# Patient Record
Sex: FEMALE | Race: WHITE | NOT HISPANIC OR LATINO | Employment: FULL TIME | URBAN - METROPOLITAN AREA
[De-identification: names, ages, dates, MRNs, and addresses within clinical notes are randomized per-mention and may not be internally consistent; named-entity substitution may affect disease eponyms.]

---

## 2021-06-02 ENCOUNTER — RECORDS - HEALTHEAST (OUTPATIENT)
Dept: ADMINISTRATIVE | Facility: CLINIC | Age: 56
End: 2021-06-02

## 2021-07-21 ENCOUNTER — RECORDS - HEALTHEAST (OUTPATIENT)
Dept: ADMINISTRATIVE | Facility: CLINIC | Age: 56
End: 2021-07-21

## 2022-08-21 ENCOUNTER — APPOINTMENT (OUTPATIENT)
Dept: RADIOLOGY | Facility: CLINIC | Age: 57
End: 2022-08-21
Attending: EMERGENCY MEDICINE
Payer: COMMERCIAL

## 2022-08-21 ENCOUNTER — HOSPITAL ENCOUNTER (EMERGENCY)
Facility: CLINIC | Age: 57
Discharge: HOME | End: 2022-08-21
Attending: EMERGENCY MEDICINE | Admitting: EMERGENCY MEDICINE
Payer: COMMERCIAL

## 2022-08-21 VITALS
RESPIRATION RATE: 14 BRPM | HEART RATE: 77 BPM | OXYGEN SATURATION: 97 % | DIASTOLIC BLOOD PRESSURE: 69 MMHG | SYSTOLIC BLOOD PRESSURE: 112 MMHG | HEIGHT: 67 IN | WEIGHT: 150 LBS | BODY MASS INDEX: 23.54 KG/M2

## 2022-08-21 DIAGNOSIS — S62.102A WRIST FRACTURE, LEFT, CLOSED, INITIAL ENCOUNTER: ICD-10-CM
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
## 2022-08-21 PROCEDURE — 99285 EMERGENCY DEPT VISIT HI MDM: CPT | Mod: 25

## 2022-08-21 PROCEDURE — 999N000065 XR WRIST LEFT 2 VIEWS: Mod: LT

## 2022-08-21 PROCEDURE — 25605 CLTX DST RDL FX/EPHYS SEP W/: CPT | Mod: LT

## 2022-08-21 PROCEDURE — 73110 X-RAY EXAM OF WRIST: CPT | Mod: LT

## 2022-08-21 RX ORDER — OXYCODONE HYDROCHLORIDE 5 MG/1
5 TABLET ORAL 3 TIMES DAILY PRN
Qty: 9 TABLET | Refills: 0 | Status: SHIPPED | OUTPATIENT
Start: 2022-08-21 | End: 2022-08-24

## 2022-08-21 ASSESSMENT — ENCOUNTER SYMPTOMS
NAUSEA: 0
JOINT SWELLING: 0
SORE THROAT: 0
CONFUSION: 0
DIARRHEA: 0
ABDOMINAL PAIN: 0
CHILLS: 0
HEMATURIA: 0
DYSURIA: 0
FEVER: 0
SHORTNESS OF BREATH: 0
VOMITING: 0
DIZZINESS: 0
WOUND: 1

## 2022-08-21 ASSESSMENT — ACTIVITIES OF DAILY LIVING (ADL)
ADLS_ACUITY_SCORE: 35
ADLS_ACUITY_SCORE: 35

## 2022-08-21 NOTE — ED TRIAGE NOTES
Pt reports left wrist pain after a fall 30 mins ago. CMS intact. Pt has abrasion to left knee.  Denies hitting her head or LOC

## 2022-08-22 NOTE — ED PROVIDER NOTES
Emergency Department Encounter     Evaluation Date & Time:   8/21/2022  6:58 PM    CHIEF COMPLAINT:  Wrist Pain      Triage Note:    Pt reports left wrist pain after a fall 30 mins ago. CMS intact. Pt has abrasion to left knee.  Denies hitting her head or LOC             ED COURSE & MEDICAL DECISION MAKING:     ED Course as of 08/21/22 2309   Sun Aug 21, 2022   1950 Slightly displaced and impacted left wrist fracture. Will try and reduce some with hematoma block (although unlikely how much reduction I will get), finger traps and splint with outpatient ortho follow up.  Pt previously broke right wrist and had surgical repair in the past.   2005 Hematoma block performed    2019 Pt placed in finger traps. Tolerating very well.   2055 Pt splinted after reduction attempt.  I suspect I did not get great reduction as she's primarily impacted and will ultimately need surgery.  Pt will arrange [facility] follow up. Rx for oxycodone for breakthrough pain.  Pt still refusing anything for pain here now.        Pt is right hand dominant presenting with left wrist injury that occurred 1 hour ago.  Pt tripped walking from dinner, landed with FOOSH. She has mild swelling/pain/tenderness to dorsal radial wrist. Refusing anything for pain.  Neurovascularly intact.  Small abrasion left knee, no head injury and otherwise well. Will get xrays of wrist, reassess.      7:11 PM I introduced myself to the patient, obtained patient history, performed a physical exam, and discussed plan for ED workup including potential diagnostic laboratory/imaging studies and interventions.        At the conclusion of the encounter I discussed the results of all the tests and the disposition. The questions were answered. The patient or family acknowledged understanding and was agreeable with the care plan.      MEDICATIONS GIVEN IN THE EMERGENCY DEPARTMENT:  Medications - No data to display    NEW PRESCRIPTIONS STARTED AT TODAY'S ED VISIT:  Discharge  Medication List as of 8/21/2022  9:43 PM      START taking these medications    Details   oxyCODONE (ROXICODONE) 5 MG tablet Take 1 tablet (5 mg) by mouth 3 times daily as needed for breakthrough pain or pain, Disp-9 tablet, R-0, Local Print             HPI   HPI     Roslyn Tyson is a 56 year old female with no pertinent history who presents to this ED by walk in for evaluation of wrist pain.    Patient was out for dinner when she tripped and fell. She landed with her outstretched left hand out and says she broke her wrist. She also got an abrasion on her left knee. She denies head injury or LOC.   Patient hasn't tried anything for the pain and refuses anything here.  No anticoagulation use.      REVIEW OF SYSTEMS:  Review of Systems   Constitutional: Negative for chills and fever.   HENT: Negative for sore throat.    Eyes: Negative for visual disturbance.   Respiratory: Negative for shortness of breath.    Cardiovascular: Negative for chest pain.   Gastrointestinal: Negative for abdominal pain, diarrhea, nausea and vomiting.   Endocrine: Negative for polyuria.   Genitourinary: Negative for dysuria and hematuria.        - urinary changes     Musculoskeletal: Negative for joint swelling.   Skin: Positive for wound (left wrist). Negative for rash.   Neurological: Negative for dizziness.        +fall   Psychiatric/Behavioral: Negative for confusion.   All other systems reviewed and are negative.        Medical History   No past medical history on file.    No past surgical history on file.    Family History   Problem Relation Age of Onset     Colon Cancer Brother        Social History     Tobacco Use     Smoking status: Never Smoker     Smokeless tobacco: Never Used   Substance Use Topics     Alcohol use: Yes     Comment: Alcoholic Drinks/day: less then once a month     Drug use: No       oxyCODONE (ROXICODONE) 5 MG tablet  atorvastatin (LIPITOR) 10 MG tablet  busPIRone (BUSPAR) 15 MG tablet  calcium carbonate-vitamin  "D3 (CALCIUM 600 + D,3,) 600 mg calcium- 200 unit cap  cholecalciferol, vitamin D3, (VITAMIN D3) 1,000 unit capsule  glucosamine-chondroitin 500-400 mg tablet  LACTOBACILLUS ACIDOPHILUS (PROBIOTIC ORAL)  magnesium 250 mg Tab  MEDICATION CANNOT BE REORDERED - PLEASE MANUALLY REORDER AND DISCONTINUE THE OLD ORDER  multivitamin capsule  sertraline (ZOLOFT) 100 MG tablet  zinc gluconate 50 mg tablet        Physical Exam     Vitals:  /69   Pulse 77   Resp 14   Ht 1.702 m (5' 7\")   Wt 68 kg (150 lb)   SpO2 97%   BMI 23.49 kg/m      PHYSICAL EXAM:   Physical Exam  Vitals and nursing note reviewed.   Constitutional:       General: She is not in acute distress.     Appearance: Normal appearance.   HENT:      Head: Normocephalic and atraumatic.      Nose: Nose normal.      Mouth/Throat:      Mouth: Mucous membranes are moist.   Eyes:      Extraocular Movements: Extraocular movements intact.   Cardiovascular:      Rate and Rhythm: Normal rate and regular rhythm.      Pulses: Normal pulses.           Radial pulses are 2+ on the right side and 2+ on the left side.   Pulmonary:      Effort: Pulmonary effort is normal.   Musculoskeletal:      Left wrist: Swelling (dorsal radial) present. Normal pulse.      Left knee: Laceration (superficial abrasion) present.      Comments: Neurovascularly intact distally to left hand   Skin:     Findings: No rash.   Neurological:      General: No focal deficit present.      Mental Status: She is alert. Mental status is at baseline.      Comments: Fluent speech   Psychiatric:         Mood and Affect: Mood normal.         Behavior: Behavior normal.         Results     LAB:  All pertinent labs reviewed and interpreted  Labs Ordered and Resulted from Time of ED Arrival to Time of ED Departure - No data to display    RADIOLOGY:  XR Wrist Left 2 Views   Final Result   IMPRESSION: Distal radius fracture is in plaster. Alignment is similar compared to previous. Tiny nondisplaced fracture of " the ulnar styloid is not well seen on this study as fine bony detail is obscured.      XR Wrist Left G/E 3 Views   Final Result   IMPRESSION: Acute, impacted and displaced intra-articular distal radius fracture with neutral to slightly volar angulation of the articular surface. Suspect tiny nondisplaced transverse fracture along the tip of the ulnar styloid. Bones are    demineralized. Soft tissue swelling.      Severe arthritic change first CMC joint.                    ECG:  none    PROCEDURES:  Procedures:  PROCEDURE: Reduction   INDICATIONS: Left wrist fracture   PROCEDURE PROVIDER: Dr Fransisco Mei   CONSENT: Risks, benefits and alternatives were discussed with and Verbal consent was obtained from Patient.   MEDICATIONS: Hematoma Block performed with a 50/50 mix of lidocaine 1% and 0.5% bupivacaine without epinephrine, a total of 7mL   REDUCTION PROCEDURE DESCRIPTION: Pt placed in finger traps and manipulation performed on radius to attempt better alignment.  Placed in sugar tong splint after.   COMPLICATIONS:  Patient tolerated procedure well, without complication       PROCEDURE: Splint Placement   INDICATIONS: left wrist fracture   PROCEDURE PROVIDER: Dr Fransisco Mei   NOTE:  A Sugar tong splint made of Plaster was applied to the Left upper extremity by the above provider. As noted in the physical exam, distal CMS was intact prior to placement. The splint was checked and the fit was adjusted to ensure proper positioning after placement. Sensation and circulation, as well as motor function, are unchanged after splint placement and the patient is more comfortable with the splint in place.           FINAL IMPRESSION:    ICD-10-CM    1. Wrist fracture, left, closed, initial encounter  S62.102A    2. Fall from ground level  W18.30XA        0 minutes of critical care time      IMTIAZ, Bharat Pineda, am serving as a scribe to document services personally performed by Dr. Santy Mei, based on my observations and the  provider's statements to me. I, Santy Mei, DO attest that Bharat Pineda is acting in a scribe capacity, has observed my performance of the services and has documented them in accordance with my direction.      Santy Mei,   Emergency Medicine  [facility] EMERGENCY ROOM  8/21/2022  7:30 PM          Santy Mei MD  08/21/22 3830

## 2022-08-22 NOTE — DISCHARGE INSTRUCTIONS
Call [facility] tomorrow morning to schedule follow up and further care with them as you did with previous right wrist. Take tylenol, naproxen for pain.  Ok to take oxycodone for breakthrough pain.  Keep splint dry.

## 2022-09-06 ENCOUNTER — TRANSFERRED RECORDS (OUTPATIENT)
Dept: HEALTH INFORMATION MANAGEMENT | Facility: CLINIC | Age: 57
End: 2022-09-06